# Patient Record
(demographics unavailable — no encounter records)

---

## 2019-03-07 NOTE — RAD
CHEST ONE VIEW:

 

History: Chest pain. 

 

Comparison: None. 

 

FINDINGS: 

Lungs are clear. No pneumothorax or effusion. Cardiac silhouette and mediastinal contours are within 
normal limits. 

 

IMPRESSION: 

No acute intrathoracic abnormality. 

 

POS: [facility]